# Patient Record
(demographics unavailable — no encounter records)

---

## 2025-07-25 NOTE — PHYSICAL EXAM
[Normal Breath Sounds] : Normal breath sounds [Normal Heart Sounds] : normal heart sounds [Alert] : alert [Oriented to Person] : oriented to person [Oriented to Place] : oriented to place [Oriented to Time] : oriented to time [Calm] : calm [de-identified] : WNL [de-identified] : WNL [de-identified] : ALESSANDRAL [de-identified] : WNL [de-identified] : WNL [FreeTextEntry1] : 2 cm left lateral external hemorrhoid thrombosis.  Exquisite tenderness noted on a limited digital exam.  Anoscopy deferred because of pain.

## 2025-07-25 NOTE — ASSESSMENT
[FreeTextEntry1] : I have seen and evaluated the patient, and I have corroborated all nursing input into this note.  Since the patient is so uncomfortable and has had multiple recurrences in the same location, I discussed the option of excision.  Indications, risks, benefits, alternatives reviewed including but not limited to bleeding, infection, fissure, and recurrence.  All questions answered.  Patient agreed.  1% lidocaine with half percent Marcaine plus epinephrine injected.  Hemorrhoid excised.  Monsel solution applied.  Sitz bath's.  Senna as previously recommended.  Tylenol alternating with ibuprofen.  Oxycodone for breakthrough pain.  Follow-up 1 month.

## 2025-07-25 NOTE — CONSULT LETTER
[Dear  ___] : Dear ~JARRETT, [Courtesy Letter:] : I had the pleasure of seeing your patient, [unfilled], in my office today. [Please see my note below.] : Please see my note below. [Consult Closing:] : Thank you very much for allowing me to participate in the care of this patient.  If you have any questions, please do not hesitate to contact me. [Sincerely,] : Sincerely, [FreeTextEntry2] : Dr. Sherman Horn [FreeTextEntry3] : Arturo Bone M.D., F.VERONICA.C.S., F.A.S.C.R.S. Chief Colorectal Clinical Services, [facility]

## 2025-07-25 NOTE — HISTORY OF PRESENT ILLNESS
[FreeTextEntry1] : Zach is a 34 y/o female here for a consultation visit, thrombosed hemorrhoid.   She is scheduled for colonoscopy with Dr. Horn in two weeks for intermittent rectal bleeding, with bowel movements. BRBPR occasionally noted in bowl months ago.  Today pt reports feeling anorectal pain since Sunday 7/20/25 takes ibuprofen, has history of the same symptoms in the past 10 years, takes sitz bath but this has been the worse so far.  Formed or soft BMs daily, sometimes straining.  Does feel excess anal tissue for a week which is very painful.  Good appetite.  No c/o nausea or vomiting.  Denies fever and chills.